# Patient Record
Sex: MALE | Race: WHITE | NOT HISPANIC OR LATINO | Employment: OTHER | URBAN - METROPOLITAN AREA
[De-identification: names, ages, dates, MRNs, and addresses within clinical notes are randomized per-mention and may not be internally consistent; named-entity substitution may affect disease eponyms.]

---

## 2017-01-26 ENCOUNTER — OFFICE VISIT (OUTPATIENT)
Dept: FAMILY MEDICINE CLINIC | Facility: CLINIC | Age: 62
End: 2017-01-26

## 2017-01-26 VITALS
HEART RATE: 85 BPM | DIASTOLIC BLOOD PRESSURE: 82 MMHG | SYSTOLIC BLOOD PRESSURE: 136 MMHG | OXYGEN SATURATION: 97 % | BODY MASS INDEX: 40.43 KG/M2 | WEIGHT: 315 LBS | HEIGHT: 74 IN | TEMPERATURE: 97.5 F

## 2017-01-26 DIAGNOSIS — R06.02 SHORTNESS OF BREATH: Primary | ICD-10-CM

## 2017-01-26 DIAGNOSIS — I50.9 ACUTE CONGESTIVE HEART FAILURE, UNSPECIFIED CONGESTIVE HEART FAILURE TYPE: ICD-10-CM

## 2017-01-26 PROCEDURE — 71020 XR CHEST PA AND LATERAL: CPT | Performed by: FAMILY MEDICINE

## 2017-01-26 PROCEDURE — 99213 OFFICE O/P EST LOW 20 MIN: CPT | Performed by: FAMILY MEDICINE

## 2017-01-26 RX ORDER — IRBESARTAN 300 MG/1
300 TABLET ORAL NIGHTLY
COMMUNITY

## 2017-01-26 NOTE — MR AVS SNAPSHOT
Evens Dominguez   1/26/2017 11:00 AM   Office Visit    Provider:  Blanka Vu MD   Department:  [facility]   Dept Phone:  [phone]                Your Full Care Plan              Today's Medication Changes          These changes are accurate as of: 1/26/17 11:34 AM.  If you have any questions, ask your nurse or doctor.               Medication(s)that have changed:     irbesartan 300 MG tablet   Commonly known as:  AVAPRO   Take 300 mg by mouth Every Night.   What changed:  Another medication with the same name was removed. Continue taking this medication, and follow the directions you see here.   Changed by:  Blanka Vu MD         Stop taking medication(s)listed here:     amoxicillin-clavulanate 875-125 MG per tablet   Commonly known as:  AUGMENTIN   Stopped by:  Blanka Vu MD           furosemide 20 MG tablet   Commonly known as:  LASIX   Stopped by:  Blanka Vu MD                      Your Updated Medication List          This list is accurate as of: 1/26/17 11:34 AM.  Always use your most recent med list.                amLODIPine 5 MG tablet   Commonly known as:  NORVASC   Take 1 tablet by mouth Daily for 180 days.       irbesartan 300 MG tablet   Commonly known as:  AVAPRO               We Performed the Following     XR Chest PA & Lateral (In Office)       You Were Diagnosed With        Codes Comments    Shortness of breath    -  Primary ICD-10-CM: R06.02  ICD-9-CM: 786.05       Instructions     None    Patient Instructions History      [employer] Signup     [facility] Eng[employer] allows you to send messages to your doctor, view your test results, renew your prescriptions, schedule appointments, and more. To sign up, go to [employer] and click on the Sign Up Now link in the New User? box. Enter your [employer] Activation Code exactly as it appears below along with the last four digits of your Social Security Number  "and your Date of Birth () to complete the sign-up process. If you do not sign up before the expiration date, you must request a new code.    [employer] Activation Code: [email or web address]  Expires: 2017 11:34 AM    If you have questions, you can email [email or web address] or call [phone] to talk to our [employer] staff. Remember, [employer] is NOT to be used for urgent needs. For medical emergencies, dial 911.               Other Info from Your Visit           Allergies     No Known Allergies      Reason for Visit     Cough     Nasal Congestion     URI     Shortness of Breath           Vital Signs     Blood Pressure Pulse Temperature Height Weight Oxygen Saturation    136/82 (BP Location: Left arm, Patient Position: Sitting, Cuff Size: Large Adult) 85 97.5 °F (36.4 °C) (Oral) 74\" (188 cm) 330 lb (150 kg) 97%    Body Mass Index Smoking Status                42.37 kg/m2 Never Smoker          Problems and Diagnoses Noted     Shortness of breath    -  Primary      "

## 2017-01-26 NOTE — PROGRESS NOTES
Subjective   Evens Dominguez is a 61 y.o. male.     History of Present Illness   Evens Dominguez 61 y.o. male who presents today for worsening SOA with orthopnea and PND..   Has had a non-productive cough with wheezing. Denies any chest pain. he has a history of   Patient Active Problem List   Diagnosis   • Hyperlipidemia   • Hypertension   • Edema   .    The following portions of the patient's history were reviewed and updated as appropriate: allergies, current medications, past family history, past medical history, past social history, past surgical history and problem list.    Review of Systems   Constitutional: Positive for fatigue. Negative for fever.   HENT: Negative for congestion and sinus pressure.    Respiratory: Positive for cough, shortness of breath and wheezing.    Cardiovascular: Negative for chest pain and leg swelling.   Gastrointestinal: Negative for nausea and vomiting.   Skin: Negative.        Objective   Physical Exam   Constitutional: He appears well-developed and well-nourished.   HENT:   Head: Normocephalic.   Eyes: EOM are normal. Pupils are equal, round, and reactive to light.   Cardiovascular: Normal rate, regular rhythm and normal heart sounds.    Pulmonary/Chest: Effort normal and breath sounds normal.   Musculoskeletal: He exhibits no edema.   Skin: Skin is warm and dry.   Psychiatric: He has a normal mood and affect. His behavior is normal. Judgment and thought content normal.   Vitals reviewed.      Assessment/Plan   Evens was seen today for cough, nasal congestion, uri and shortness of breath.    Diagnoses and all orders for this visit:    Shortness of breath  -     XR Chest PA & Lateral (In Office)    Acute congestive heart failure, unspecified congestive heart failure type    Instructed to immediately to go to the ER